# Patient Record
Sex: FEMALE | URBAN - METROPOLITAN AREA
[De-identification: names, ages, dates, MRNs, and addresses within clinical notes are randomized per-mention and may not be internally consistent; named-entity substitution may affect disease eponyms.]

---

## 2018-04-02 ENCOUNTER — APPOINTMENT (RX ONLY)
Dept: URBAN - METROPOLITAN AREA CLINIC 51 | Facility: CLINIC | Age: 33
Setting detail: DERMATOLOGY
End: 2018-04-02

## 2018-04-02 DIAGNOSIS — L259 CONTACT DERMATITIS AND OTHER ECZEMA, UNSPECIFIED CAUSE: ICD-10-CM

## 2018-04-02 PROBLEM — L23.9 ALLERGIC CONTACT DERMATITIS, UNSPECIFIED CAUSE: Status: ACTIVE | Noted: 2018-04-02

## 2018-04-02 PROCEDURE — ? PRESCRIPTION

## 2018-04-02 PROCEDURE — 99201: CPT

## 2018-04-02 PROCEDURE — ? TREATMENT REGIMEN

## 2018-04-02 PROCEDURE — ? COUNSELING

## 2018-04-02 RX ORDER — CLOCORTOLONE PIVALATE 0 G/G
CREAM TOPICAL
Qty: 45 | Refills: 1 | Status: ERX | COMMUNITY
Start: 2018-04-02

## 2018-04-02 RX ADMIN — CLOCORTOLONE PIVALATE: 0 CREAM TOPICAL at 14:40

## 2018-04-02 ASSESSMENT — LOCATION DETAILED DESCRIPTION DERM
LOCATION DETAILED: RIGHT INFERIOR CENTRAL MALAR CHEEK
LOCATION DETAILED: LEFT INFERIOR CENTRAL MALAR CHEEK

## 2018-04-02 ASSESSMENT — PAIN INTENSITY VAS: HOW INTENSE IS YOUR PAIN 0 BEING NO PAIN, 10 BEING THE MOST SEVERE PAIN POSSIBLE?: NO PAIN

## 2018-04-02 ASSESSMENT — LOCATION ZONE DERM: LOCATION ZONE: FACE

## 2018-04-02 ASSESSMENT — SEVERITY ASSESSMENT: SEVERITY: ALMOST CLEAR

## 2018-04-02 ASSESSMENT — LOCATION SIMPLE DESCRIPTION DERM
LOCATION SIMPLE: LEFT CHEEK
LOCATION SIMPLE: RIGHT CHEEK

## 2018-04-02 ASSESSMENT — BSA RASH: BSA RASH: 3

## 2018-04-02 NOTE — HPI: RASH
How Severe Is Your Rash?: mild
Is This A New Presentation, Or A Follow-Up?: Rash
Additional History: Topical helped but she feels like dried her out

## 2018-04-02 NOTE — PROCEDURE: TREATMENT REGIMEN
Samples Given: Cloderm
Plan: Call if no improvement in 2 weeks
Initiate Treatment: Cloderm cream twice daily for 2 weeks
Detail Level: Zone